# Patient Record
Sex: MALE | Race: WHITE | URBAN - METROPOLITAN AREA
[De-identification: names, ages, dates, MRNs, and addresses within clinical notes are randomized per-mention and may not be internally consistent; named-entity substitution may affect disease eponyms.]

---

## 2019-03-25 ENCOUNTER — HISTORICAL (OUTPATIENT)
Dept: LAB | Facility: HOSPITAL | Age: 24
End: 2019-03-25

## 2019-03-28 LAB — FINAL CULTURE: NORMAL

## 2025-05-02 ENCOUNTER — OFFICE VISIT (OUTPATIENT)
Dept: URGENT CARE | Facility: CLINIC | Age: 30
End: 2025-05-02
Payer: COMMERCIAL

## 2025-05-02 VITALS
BODY MASS INDEX: 34.59 KG/M2 | SYSTOLIC BLOOD PRESSURE: 118 MMHG | HEIGHT: 73 IN | HEART RATE: 83 BPM | WEIGHT: 261 LBS | RESPIRATION RATE: 18 BRPM | TEMPERATURE: 98 F | OXYGEN SATURATION: 99 % | DIASTOLIC BLOOD PRESSURE: 84 MMHG

## 2025-05-02 DIAGNOSIS — L73.2 HIDRADENITIS SUPPURATIVA: Primary | ICD-10-CM

## 2025-05-02 RX ORDER — DOXYCYCLINE 100 MG/1
100 CAPSULE ORAL 2 TIMES DAILY
Qty: 14 CAPSULE | Refills: 0 | Status: SHIPPED | OUTPATIENT
Start: 2025-05-02 | End: 2025-05-09

## 2025-05-02 RX ORDER — MUPIROCIN 20 MG/G
OINTMENT TOPICAL 2 TIMES DAILY
Qty: 1 EACH | Refills: 0 | Status: SHIPPED | OUTPATIENT
Start: 2025-05-02 | End: 2025-05-12

## 2025-05-02 NOTE — PATIENT INSTRUCTIONS
Plan:   Medications sent to pharmacy  Continue your warm soaks or warm compresses through 4 times a day for 10-15 minutes to continue drainage from the area  Monitor for fever  Tylenol or Motrin as needed  Follow-up with your dermatologist as needed  Contact our clinic with any concerns

## 2025-05-02 NOTE — PROGRESS NOTES
"Subjective:      Patient ID: Momo Mueller is a 29 y.o. male.    Vitals:  height is 6' 1" (1.854 m) and weight is 118.4 kg (261 lb). His oral temperature is 98.1 °F (36.7 °C). His blood pressure is 118/84 and his pulse is 83. His respiration is 18 and oxygen saturation is 99%.     Chief Complaint: No chief complaint on file.     Patient is a 29 y.o. male who presents to urgent care with complaints of boil on Rt groin area. X 2 days. Patient denies drainage or fever. Pt has a hx of HS skin condition.  States he has been soaking in a warm tub.  States it typically helps.  Saw Dermatology about a year ago was diagnosed with HS.        Constitution: Negative.   HENT: Negative.     Neck: neck negative.   Cardiovascular: Negative.    Eyes: Negative.    Respiratory: Negative.     Gastrointestinal: Negative.    Genitourinary: Negative.    Musculoskeletal: Negative.    Skin: Negative.  Positive for erythema (There is an area of erythema and induration of the right going area.  Upon examination there is purulent material patient states it must have started draining since he left the house because it was not draining at that time.) and abscess.   Allergic/Immunologic: Negative.    Neurological: Negative.    Hematologic/Lymphatic: Negative.       Objective:     Physical Exam   Constitutional: He is oriented to person, place, and time.  Non-toxic appearance. He does not appear ill. No distress.   HENT:   Head: Normocephalic and atraumatic.   Pulmonary/Chest: Effort normal. No respiratory distress.   Abdominal: Normal appearance.   Neurological: He is alert and oriented to person, place, and time.   Skin: Skin is not diaphoretic. erythema (There is an area of erythema and induration of the right going area.  Upon examination there is purulent material patient states it must have started draining since he left the house because it was not draining at that time.)   Psychiatric: His behavior is normal. Mood, judgment and " "thought content normal.   Vitals reviewed.         Previous History      Review of patient's allergies indicates:  No Known Allergies    Past Medical History:   Diagnosis Date    Hidradenitis suppurativa      Current Outpatient Medications   Medication Instructions    doxycycline (MONODOX) 100 mg, Oral, 2 times daily    mupirocin (BACTROBAN) 2 % ointment Topical (Top), 2 times daily     Past Surgical History:   Procedure Laterality Date    NO PAST SURGERIES       Family History   Family history unknown: Yes       Social History[1]     Physical Exam      Vital Signs Reviewed   /84   Pulse 83   Temp 98.1 °F (36.7 °C) (Oral)   Resp 18   Ht 6' 1" (1.854 m)   Wt 118.4 kg (261 lb)   SpO2 99%   BMI 34.43 kg/m²        Procedures    Procedures     Labs     Results for orders placed or performed in visit on 03/25/19   Wound Culture    Collection Time: 03/25/19  3:19 PM    Specimen: Other   Result Value Ref Range    FINAL CULTURE Normal skin charissa isolated        Assessment:     1. Hidradenitis suppurativa        Plan:   Medications sent to pharmacy  Continue your warm soaks or warm compresses through 4 times a day for 10-15 minutes to continue drainage from the area  Monitor for fever  Tylenol or Motrin as needed  Follow-up with your dermatologist as needed  Contact our clinic with any concerns    Hidradenitis suppurativa    Other orders  -     doxycycline (MONODOX) 100 MG capsule; Take 1 capsule (100 mg total) by mouth 2 (two) times daily. for 7 days  Dispense: 14 capsule; Refill: 0  -     mupirocin (BACTROBAN) 2 % ointment; Apply topically 2 (two) times daily. for 10 days  Dispense: 1 each; Refill: 0                         [1]   Social History  Tobacco Use    Smoking status: Every Day     Types: Cigarettes    Smokeless tobacco: Never   Substance Use Topics    Alcohol use: Never    Drug use: Yes     Types: Marijuana     "